# Patient Record
Sex: MALE
[De-identification: names, ages, dates, MRNs, and addresses within clinical notes are randomized per-mention and may not be internally consistent; named-entity substitution may affect disease eponyms.]

---

## 2021-04-13 ENCOUNTER — NURSE TRIAGE (OUTPATIENT)
Dept: OTHER | Facility: CLINIC | Age: 26
End: 2021-04-13

## 2021-04-13 NOTE — TELEPHONE ENCOUNTER
Reason for Disposition   MODERATE eyelid twitch or spasm (interferes with school or work)    Answer Assessment - Initial Assessment Questions  1. ONSET: \"When did your symptoms start? \" (e.g., hours, days, weeks, months)  5 days ago    2. DURATION: \"How long does the eye twitch or spasm last?\"  10-20 minutes     3. FREQUENCY:  \"How often does this happen?\"   4 or 5 times a day    4. LOCATION:  \"Where does the twitch or spasms occur? \" (e.g., upper lid, lower lid, one or both eyes)  Right eye, both upper and lower eyelid     5. VISION: \"Do you have blurred vision? \"   Denies     6. OTHER SYMPTOMS: \"Do you have any other symptoms? \" (e.g., eye pain, eye redness or drainage)  Body aches - mostly upper body    7. CAUSE: \"What do you think is causing your symptoms? \" (e.g., eye strain, fatigue)  Pt is not sure    Protocols used: EYELID TWITCH OR SPASM-ADULT-AH    Brief description of triage:   Pt is calling with c/o right eye twitching and body aches. Pt states that the eye twitching has been off and on for the last 5 days. The twitching is affecting his daily activities, such as driving. Triage indicates for patient to see a provider within the next 3 days. Care advice provided, patient verbalizes understanding; denies any other questions or concerns; instructed to call back for any new or worsening symptoms. This triage is a result of a call to [address] [place] [address]. Please do not respond to the triage nurse through this encounter. Any subsequent communication should be directly with the patient.

## 2021-06-10 ENCOUNTER — NURSE TRIAGE (OUTPATIENT)
Dept: OTHER | Facility: CLINIC | Age: 26
End: 2021-06-10

## 2021-06-10 NOTE — TELEPHONE ENCOUNTER
Brief description of triage: Right sided back pressure/tightness 1-2 weeks ago. Triage indicates for patient to be seen within 2 weeks. Patient agrees to plan. Care advice provided, patient verbalizes understanding; denies any other questions or concerns; instructed to call back for any new or worsening symptoms. This triage is a result of a call to [address] [place] [address]. Please do not respond to the triage nurse through this encounter. Any subsequent communication should be directly with the patient. Unable to route to PCP. Reason for Disposition   Back pain lasts > 2 weeks    Answer Assessment - Initial Assessment Questions  1. ONSET: \"When did the pain begin? \"       1-2 weeks ago    2. LOCATION: \"Where does it hurt? \" (upper, mid or lower back)      Right side of back by kidney    3. SEVERITY: \"How bad is the pain? \"  (e.g., Scale 1-10; mild, moderate, or severe)    - MILD (1-3): doesn't interfere with normal activities     - MODERATE (4-7): interferes with normal activities or awakens from sleep     - SEVERE (8-10): excruciating pain, unable to do any normal activities       No pain just pressure and tightness    4. PATTERN: \"Is the pain constant? \" (e.g., yes, no; constant, intermittent)       On and off pressure    5. RADIATION: \"Does the pain shoot into your legs or elsewhere? \"      No    6. CAUSE:  \"What do you think is causing the back pain? \"       Unsure    7. BACK OVERUSE:  Antony Gouty recent lifting of heavy objects, strenuous work or exercise? \"      Feels tight    8. MEDICATIONS: \"What have you taken so far for the pain? \" (e.g., nothing, acetaminophen, NSAIDS)      Nothing    9. NEUROLOGIC SYMPTOMS: \"Do you have any weakness, numbness, or problems with bowel/bladder control? \"      No    10. OTHER SYMPTOMS: \"Do you have any other symptoms? \" (e.g., fever, abdominal pain, burning with urination, blood in urine)        No    11. PREGNANCY: \"Is there any chance you are pregnant? \" (e.g., yes, no; LMP)        No    Protocols used: BACK PAIN-ADULT-[place]

## 2023-01-09 ENCOUNTER — NURSE TRIAGE (OUTPATIENT)
Dept: OTHER | Facility: CLINIC | Age: 28
End: 2023-01-09
